# Patient Record
(demographics unavailable — no encounter records)

---

## 2024-12-16 NOTE — PROCEDURE
[IUD Placement] : intrauterine device (IUD) placement [Consent Obtained] : Consent obtained [Prevention of Pregnancy] : prevention of pregnancy [Risks] : risks [Benefits] : benefits [Alternatives] : alternatives [Patient] : patient [Neg Pregnancy Test] : negative pregnancy test [Neg GC/Chlamydia] : negative GC/Chlamydia [ParaGard IUD] : ParaGard IUD [Betadine] : Betadine [de-identified] : Ashlee Clamp  [de-identified] :  [id number] [de-identified] : 10/2029

## 2025-06-12 NOTE — PHYSICAL EXAM
[Alert] : alert [Normal Voice/Communication] : normal voice/communication [Healthy Appearing] : healthy appearing [No Acute Distress] : no acute distress [Hearing Threshold Finger Rub Not [place]] : hearing was normal [Sclera] : the sclera and conjunctiva were normal [Normal Lips/Gums] : the lips and gums were normal [Oropharynx] : the oropharynx was normal [Normal Appearance] : the appearance of the neck was normal [No Neck Mass] : no neck mass was observed [No Respiratory Distress] : no respiratory distress [No Acc Muscle Use] : no accessory muscle use [Respiration, Rhythm And Depth] : normal respiratory rhythm and effort [Auscultation Breath Sounds / Voice Sounds] : lungs were clear to auscultation bilaterally [Heart Rate And Rhythm] : heart rate was normal and rhythm regular [Normal S1, S2] : normal S1 and S2 [Murmurs] : no murmurs [Bowel Sounds] : normal bowel sounds [Abdomen Tenderness] : non-tender [No Masses] : no abdominal mass palpated [Abdomen Soft] : soft [Oriented To Time, Place, And Person] : oriented to person, place, and time [] : no hepatosplenomegaly

## 2025-06-12 NOTE — HISTORY OF PRESENT ILLNESS
[FreeTextEntry1] : 32yoF average risk for CRC. No significant PMHX. Presents today for CRC Screening.   Patient's brother had a CRC Screening at age 39 and found to complicated precancerous polyps and was told siblings should start early screening.    Patient denies any abdominal pain, nausea, vomiting, dysphagia, heart burn, unintentional weight loss, diarrhea, constipation, melena, hematochezia.    Never had a colonsocopy.    Labs Reviewed 11/24 Hgb: 13.4 Hct: 40.4  5/22/25 Labs Reviewed  LFTS WNL

## 2025-06-12 NOTE — ASSESSMENT
[FreeTextEntry1] : 32yoF average risk for CRC. No significant PMHX. Presents today for CRC Screening.    Family hx of complicated precancerous polyps (brother at 39 year old) CRC Screening -Will schedule colonoscopy -Risks vs. benefits discussed -Educated on generic suprep and dulcolax Prep -Patient will obtain records from brothers procedure as well   I have spent 50 minutes of time on the encounter which excludes teaching time and/or separately reported services.

## 2025-06-12 NOTE — END OF VISIT
[Time Spent: ___ minutes] : I have spent [unfilled] minutes of time on the encounter which excludes teaching and separately reported services. [FreeTextEntry3] :  I, Dr. Valle, personally performed the evaluation and management (E/M) services for this new patient. That E/M includes conducting the clinically appropriate initial history &/or exam, assessing all conditions, and establishing the plan of care. Today, my MONAE, was here to observe my evaluation and management service for this patient & follow plan of care established by me going forward.